# Patient Record
Sex: MALE | NOT HISPANIC OR LATINO | URBAN - METROPOLITAN AREA
[De-identification: names, ages, dates, MRNs, and addresses within clinical notes are randomized per-mention and may not be internally consistent; named-entity substitution may affect disease eponyms.]

---

## 2018-03-26 ENCOUNTER — COMMUNICATION - HEALTHEAST (OUTPATIENT)
Dept: TELEHEALTH | Facility: CLINIC | Age: 28
End: 2018-03-26

## 2018-03-26 ENCOUNTER — OFFICE VISIT - HEALTHEAST (OUTPATIENT)
Dept: INTERNAL MEDICINE | Facility: CLINIC | Age: 28
End: 2018-03-26

## 2018-03-26 DIAGNOSIS — R07.2 PRECORDIAL PAIN: ICD-10-CM

## 2018-03-26 DIAGNOSIS — Z00.00 HEALTH CARE MAINTENANCE: ICD-10-CM

## 2018-03-26 LAB
ALBUMIN SERPL-MCNC: 4.2 G/DL (ref 3.5–5)
ALBUMIN UR-MCNC: NEGATIVE MG/DL
ALP SERPL-CCNC: 65 U/L (ref 45–120)
ALT SERPL W P-5'-P-CCNC: 19 U/L (ref 0–45)
ANION GAP SERPL CALCULATED.3IONS-SCNC: 8 MMOL/L (ref 5–18)
APPEARANCE UR: CLEAR
AST SERPL W P-5'-P-CCNC: 23 U/L (ref 0–40)
BACTERIA #/AREA URNS HPF: ABNORMAL HPF
BILIRUB SERPL-MCNC: 0.7 MG/DL (ref 0–1)
BILIRUB UR QL STRIP: NEGATIVE
BUN SERPL-MCNC: 7 MG/DL (ref 8–22)
CALCIUM SERPL-MCNC: 9.4 MG/DL (ref 8.5–10.5)
CHLORIDE BLD-SCNC: 103 MMOL/L (ref 98–107)
CHOLEST SERPL-MCNC: 251 MG/DL
CO2 SERPL-SCNC: 27 MMOL/L (ref 22–31)
COLOR UR AUTO: YELLOW
CREAT SERPL-MCNC: 0.86 MG/DL (ref 0.7–1.3)
ERYTHROCYTE [DISTWIDTH] IN BLOOD BY AUTOMATED COUNT: 12.6 % (ref 11–14.5)
FASTING STATUS PATIENT QL REPORTED: NO
GFR SERPL CREATININE-BSD FRML MDRD: >60 ML/MIN/1.73M2
GLUCOSE BLD-MCNC: 83 MG/DL (ref 70–125)
GLUCOSE UR STRIP-MCNC: NEGATIVE MG/DL
HCT VFR BLD AUTO: 46.2 % (ref 40–54)
HDLC SERPL-MCNC: 60 MG/DL
HGB BLD-MCNC: 15.7 G/DL (ref 14–18)
HGB UR QL STRIP: ABNORMAL
KETONES UR STRIP-MCNC: NEGATIVE MG/DL
LDLC SERPL CALC-MCNC: 175 MG/DL
LEUKOCYTE ESTERASE UR QL STRIP: NEGATIVE
MCH RBC QN AUTO: 27.9 PG (ref 27–34)
MCHC RBC AUTO-ENTMCNC: 33.9 G/DL (ref 32–36)
MCV RBC AUTO: 82 FL (ref 80–100)
NITRATE UR QL: NEGATIVE
PH UR STRIP: 7 [PH] (ref 5–8)
PLATELET # BLD AUTO: 261 THOU/UL (ref 140–440)
PMV BLD AUTO: 7.7 FL (ref 7–10)
POTASSIUM BLD-SCNC: 3.7 MMOL/L (ref 3.5–5)
PROT SERPL-MCNC: 8 G/DL (ref 6–8)
RBC # BLD AUTO: 5.62 MILL/UL (ref 4.4–6.2)
RBC #/AREA URNS AUTO: ABNORMAL HPF
SODIUM SERPL-SCNC: 138 MMOL/L (ref 136–145)
SP GR UR STRIP: 1.01 (ref 1–1.03)
TRIGL SERPL-MCNC: 81 MG/DL
UROBILINOGEN UR STRIP-ACNC: ABNORMAL
WBC #/AREA URNS AUTO: ABNORMAL HPF
WBC: 4.7 THOU/UL (ref 4–11)

## 2018-03-26 ASSESSMENT — MIFFLIN-ST. JEOR: SCORE: 1945.09

## 2018-03-27 ENCOUNTER — COMMUNICATION - HEALTHEAST (OUTPATIENT)
Dept: INTERNAL MEDICINE | Facility: CLINIC | Age: 28
End: 2018-03-27

## 2019-02-05 ENCOUNTER — COMMUNICATION - HEALTHEAST (OUTPATIENT)
Dept: INTERNAL MEDICINE | Facility: CLINIC | Age: 29
End: 2019-02-05

## 2019-02-07 ENCOUNTER — OFFICE VISIT - HEALTHEAST (OUTPATIENT)
Dept: INTERNAL MEDICINE | Facility: CLINIC | Age: 29
End: 2019-02-07

## 2019-02-07 DIAGNOSIS — K59.01 SLOW TRANSIT CONSTIPATION: ICD-10-CM

## 2019-02-07 ASSESSMENT — MIFFLIN-ST. JEOR: SCORE: 1963.69

## 2019-02-15 ENCOUNTER — OFFICE VISIT - HEALTHEAST (OUTPATIENT)
Dept: INTERNAL MEDICINE | Facility: CLINIC | Age: 29
End: 2019-02-15

## 2019-02-15 DIAGNOSIS — Z00.00 ROUTINE GENERAL MEDICAL EXAMINATION AT A HEALTH CARE FACILITY: ICD-10-CM

## 2019-02-15 DIAGNOSIS — Z00.00 HEALTH CARE MAINTENANCE: ICD-10-CM

## 2019-02-15 DIAGNOSIS — E78.00 HYPERCHOLESTEROLEMIA: ICD-10-CM

## 2019-02-15 DIAGNOSIS — E55.9 VITAMIN D DEFICIENCY: ICD-10-CM

## 2019-02-15 LAB
ALBUMIN SERPL-MCNC: 4.5 G/DL (ref 3.5–5)
ALBUMIN UR-MCNC: NEGATIVE MG/DL
ALP SERPL-CCNC: 69 U/L (ref 45–120)
ALT SERPL W P-5'-P-CCNC: 11 U/L (ref 0–45)
ANION GAP SERPL CALCULATED.3IONS-SCNC: 9 MMOL/L (ref 5–18)
APPEARANCE UR: CLEAR
AST SERPL W P-5'-P-CCNC: 22 U/L (ref 0–40)
BACTERIA #/AREA URNS HPF: ABNORMAL HPF
BILIRUB SERPL-MCNC: 0.7 MG/DL (ref 0–1)
BILIRUB UR QL STRIP: NEGATIVE
BUN SERPL-MCNC: 7 MG/DL (ref 8–22)
CALCIUM SERPL-MCNC: 9.8 MG/DL (ref 8.5–10.5)
CHLORIDE BLD-SCNC: 102 MMOL/L (ref 98–107)
CHOLEST SERPL-MCNC: 192 MG/DL
CO2 SERPL-SCNC: 27 MMOL/L (ref 22–31)
COLOR UR AUTO: YELLOW
CREAT SERPL-MCNC: 0.95 MG/DL (ref 0.7–1.3)
ERYTHROCYTE [DISTWIDTH] IN BLOOD BY AUTOMATED COUNT: 11.3 % (ref 11–14.5)
FASTING STATUS PATIENT QL REPORTED: YES
GFR SERPL CREATININE-BSD FRML MDRD: >60 ML/MIN/1.73M2
GLUCOSE BLD-MCNC: 75 MG/DL (ref 70–125)
GLUCOSE UR STRIP-MCNC: NEGATIVE MG/DL
HCT VFR BLD AUTO: 44.5 % (ref 40–54)
HDLC SERPL-MCNC: 59 MG/DL
HGB BLD-MCNC: 14.7 G/DL (ref 14–18)
HGB UR QL STRIP: ABNORMAL
KETONES UR STRIP-MCNC: NEGATIVE MG/DL
LDLC SERPL CALC-MCNC: 120 MG/DL
LEUKOCYTE ESTERASE UR QL STRIP: NEGATIVE
MCH RBC QN AUTO: 28 PG (ref 27–34)
MCHC RBC AUTO-ENTMCNC: 33.1 G/DL (ref 32–36)
MCV RBC AUTO: 85 FL (ref 80–100)
NITRATE UR QL: NEGATIVE
PH UR STRIP: 6.5 [PH] (ref 5–8)
PLATELET # BLD AUTO: 290 THOU/UL (ref 140–440)
PMV BLD AUTO: 7.4 FL (ref 7–10)
POTASSIUM BLD-SCNC: 4 MMOL/L (ref 3.5–5)
PROT SERPL-MCNC: 7.6 G/DL (ref 6–8)
RBC # BLD AUTO: 5.25 MILL/UL (ref 4.4–6.2)
RBC #/AREA URNS AUTO: ABNORMAL HPF
SODIUM SERPL-SCNC: 138 MMOL/L (ref 136–145)
SP GR UR STRIP: 1.02 (ref 1–1.03)
SQUAMOUS #/AREA URNS AUTO: ABNORMAL LPF
TRIGL SERPL-MCNC: 63 MG/DL
UROBILINOGEN UR STRIP-ACNC: ABNORMAL
WBC #/AREA URNS AUTO: ABNORMAL HPF
WBC: 4.9 THOU/UL (ref 4–11)

## 2019-02-15 ASSESSMENT — MIFFLIN-ST. JEOR: SCORE: 1947.55

## 2019-02-18 LAB — 25(OH)D3 SERPL-MCNC: 11.1 NG/ML (ref 30–80)

## 2019-02-19 ENCOUNTER — COMMUNICATION - HEALTHEAST (OUTPATIENT)
Dept: INTERNAL MEDICINE | Facility: CLINIC | Age: 29
End: 2019-02-19

## 2019-02-19 DIAGNOSIS — E55.9 VITAMIN D DEFICIENCY: ICD-10-CM

## 2019-04-05 ENCOUNTER — COMMUNICATION - HEALTHEAST (OUTPATIENT)
Dept: INTERNAL MEDICINE | Facility: CLINIC | Age: 29
End: 2019-04-05

## 2019-04-12 ENCOUNTER — COMMUNICATION - HEALTHEAST (OUTPATIENT)
Dept: INTERNAL MEDICINE | Facility: CLINIC | Age: 29
End: 2019-04-12

## 2019-04-12 ENCOUNTER — OFFICE VISIT - HEALTHEAST (OUTPATIENT)
Dept: INTERNAL MEDICINE | Facility: CLINIC | Age: 29
End: 2019-04-12

## 2019-04-12 DIAGNOSIS — R07.9 CHEST PAIN, UNSPECIFIED TYPE: ICD-10-CM

## 2019-04-12 DIAGNOSIS — R07.89 CHEST WALL PAIN: ICD-10-CM

## 2019-04-12 LAB
ATRIAL RATE - MUSE: 74 BPM
DIASTOLIC BLOOD PRESSURE - MUSE: NORMAL MMHG
INTERPRETATION ECG - MUSE: NORMAL
P AXIS - MUSE: 60 DEGREES
PR INTERVAL - MUSE: 188 MS
QRS DURATION - MUSE: 86 MS
QT - MUSE: 356 MS
QTC - MUSE: 395 MS
R AXIS - MUSE: 71 DEGREES
SYSTOLIC BLOOD PRESSURE - MUSE: NORMAL MMHG
T AXIS - MUSE: 18 DEGREES
VENTRICULAR RATE- MUSE: 74 BPM

## 2019-04-12 ASSESSMENT — MIFFLIN-ST. JEOR: SCORE: 1960.06

## 2019-04-15 ENCOUNTER — OFFICE VISIT - HEALTHEAST (OUTPATIENT)
Dept: FAMILY MEDICINE | Facility: CLINIC | Age: 29
End: 2019-04-15

## 2019-04-15 ENCOUNTER — HOSPITAL ENCOUNTER (OUTPATIENT)
Dept: CARDIOLOGY | Facility: CLINIC | Age: 29
Discharge: HOME | End: 2019-04-15

## 2019-04-15 DIAGNOSIS — M99.02 SOMATIC DYSFUNCTION OF SPINE, THORACIC: ICD-10-CM

## 2019-04-15 DIAGNOSIS — R07.9 CHEST PAIN, UNSPECIFIED TYPE: ICD-10-CM

## 2019-04-15 DIAGNOSIS — R07.89 CHEST WALL PAIN: ICD-10-CM

## 2019-04-15 DIAGNOSIS — M99.08 SOMATIC DYSFUNCTION OF RIB: ICD-10-CM

## 2019-04-15 LAB
CV STRESS CURRENT BP HE: NORMAL
CV STRESS CURRENT HR HE: 100
CV STRESS CURRENT HR HE: 101
CV STRESS CURRENT HR HE: 101
CV STRESS CURRENT HR HE: 104
CV STRESS CURRENT HR HE: 106
CV STRESS CURRENT HR HE: 107
CV STRESS CURRENT HR HE: 112
CV STRESS CURRENT HR HE: 113
CV STRESS CURRENT HR HE: 115
CV STRESS CURRENT HR HE: 119
CV STRESS CURRENT HR HE: 120
CV STRESS CURRENT HR HE: 121
CV STRESS CURRENT HR HE: 124
CV STRESS CURRENT HR HE: 126
CV STRESS CURRENT HR HE: 131
CV STRESS CURRENT HR HE: 136
CV STRESS CURRENT HR HE: 138
CV STRESS CURRENT HR HE: 146
CV STRESS CURRENT HR HE: 146
CV STRESS CURRENT HR HE: 148
CV STRESS CURRENT HR HE: 154
CV STRESS CURRENT HR HE: 157
CV STRESS CURRENT HR HE: 157
CV STRESS CURRENT HR HE: 165
CV STRESS CURRENT HR HE: 165
CV STRESS CURRENT HR HE: 169
CV STRESS CURRENT HR HE: 82
CV STRESS CURRENT HR HE: 88
CV STRESS CURRENT HR HE: 99
CV STRESS CURRENT HR HE: 99
CV STRESS DEVIATION TIME HE: NORMAL
CV STRESS ECHO PERCENT HR HE: NORMAL
CV STRESS EXERCISE STAGE HE: NORMAL
CV STRESS EXERCISE STAGE REACHED HE: NORMAL
CV STRESS FINAL RESTING BP HE: NORMAL
CV STRESS FINAL RESTING HR HE: 101
CV STRESS MAX HR HE: 170
CV STRESS MAX TREADMILL GRADE HE: 16
CV STRESS MAX TREADMILL SPEED HE: 4.2
CV STRESS PEAK DIA BP HE: NORMAL
CV STRESS PEAK SYS BP HE: NORMAL
CV STRESS PHASE HE: NORMAL
CV STRESS PROTOCOL HE: NORMAL
CV STRESS RESTING PT POSITION HE: NORMAL
CV STRESS RESTING PT POSITION HE: NORMAL
CV STRESS ST DEVIATION AMOUNT HE: NORMAL
CV STRESS ST DEVIATION ELEVATION HE: NORMAL
CV STRESS ST EVELATION AMOUNT HE: NORMAL
CV STRESS TEST TYPE HE: NORMAL
CV STRESS TOTAL STAGE TIME MIN 1 HE: NORMAL
STRESS ECHO BASELINE BP: NORMAL
STRESS ECHO BASELINE HR: 82
STRESS ECHO CALCULATED PERCENT HR: 89 %
STRESS ECHO LAST STRESS BP: NORMAL
STRESS ECHO LAST STRESS HR: 169
STRESS ECHO POST ESTIMATED WORKLOAD: 10.9
STRESS ECHO POST EXERCISE DUR MIN: 9
STRESS ECHO POST EXERCISE DUR SEC: 25
STRESS ECHO TARGET HR: 169.99

## 2019-04-16 ENCOUNTER — COMMUNICATION - HEALTHEAST (OUTPATIENT)
Dept: INTERNAL MEDICINE | Facility: CLINIC | Age: 29
End: 2019-04-16

## 2019-11-06 ENCOUNTER — COMMUNICATION - HEALTHEAST (OUTPATIENT)
Dept: INTERNAL MEDICINE | Facility: CLINIC | Age: 29
End: 2019-11-06

## 2021-05-27 NOTE — PATIENT INSTRUCTIONS - HE
Try arnica montana or voltaren gel.  May also try osteopathic manipulation.  Adenike Allen in [place] does this.  Doctor of Osteopathy.    Go to ER if acute worsening

## 2021-05-27 NOTE — TELEPHONE ENCOUNTER
Chest pain it is on the left side is where it started but now it is on the right and left    The pain is only in the chest doesn't radiate    The pain has been going on for a year    Sometimes the pain is sharp and sometimes the pain is itchy.    The pain is constant this week    Has not been able to find anything that makes the pain worse and nothing makes it better    Rates his pain 5 out of 10    No dizziness,    No nausea    No vomiting    No sweating    No fever    No shortness of breath    No cough    Was seen by Dr Scott for this on 2/15/19:    6.  Episodic precordial chest discomfort  He has noted this on and off for years.  It is described as a vague twinge or poking in the left chest which is not related to exertion.  He was reassured that it does not seem      Appointment scheduled for today    Janice Felipe RN  [facility] Refill and Triage Nurse  4/12/2019  1:14 PM      Reason for Disposition    Patient wants to be seen    Protocols used: CHEST PAIN-A-OH

## 2021-05-27 NOTE — PROGRESS NOTES
ASSESSMENT and PLAN:  Karina was seen today for chest pain.    Diagnoses and all orders for this visit:    Chest wall pain    Somatic dysfunction of rib    Somatic dysfunction of spine, thoracic      Differential diagnosis of atypical chest pain includes costochondritis, somatic dysfunction.  Cardiac abnormality was ruled out today with stress testing.  I would also consider evaluating for aneurysm as that could potentially be life-threatening and would consider chest CT with contrast if symptoms persist.  Patient has a difficult time describing his pain and cannot tell if it is more in the chest wall versus more internal in the lungs.  No concern in review of systems and history for underlying lung pathology and does not sound consistent with pulmonary embolism.  He does have chest wall pain to palpation that could be consistent with costochondritis and we discussed treatment with NSAIDs.  Symptoms are odd and seem to be worse with eating but he denies acid reflux symptoms.  We discussed it could be secondary to expansion of his rib cage and abdomen that he senses his symptoms more so he is going to work on smaller portions and may be see if there is a correlation with the type of food.  Osteopathic manual therapy was performed today and he is going to evaluate if he did receive any relief with that.  He will let me know if he would like to come back to schedule a follow-up adjustment if it was helpful      Patient Instructions   -keep a diary of when the pain comes on with food: large portions? Small portions? Type of food ?     Ok to use ibuprofen 200mg- 2-3 tablets every 6 hours as needed   Or   Aleve naproxen 1 tablet twice daily as needed     -if symptoms continue call me and I will order a chest CT with contrast otherwise we can consider  An adjustment in the future as well.       No orders of the defined types were placed in this encounter.    There are no discontinued medications.    No follow-ups on  file.    DATA REVIEWED:  Additional History from Old Records Summarized (2): Reviewed office notes from PCP and Dr. Granados regarding chest pain  Decision to Obtain Records (1):    Radiology Tests Summarized or Ordered (1): Reviewed chest x-ray results that were normal  Labs Reviewed or Ordered (1):    Medicine Test Summarized or Ordered (1): Reviewed normal stress testing today  Independent Review of EKG, X-RAY, or RAPID STREP (2 each):      The visit lasted a total of 30  minutes face to face with the patient. Over 50% of the time was spent counseling and educating the patient regarding above issues and at least 10-15 minutes spent performing osteopathic manual therapy.    CHIEF COMPLAINT:  Chief Complaint   Patient presents with     Chest Pain     Starts with a sharp pain in the chest pain and spreads across the chest to the shoulders and back.  Has seen chiro, PMD and stress test.         HISTORY OF PRESENT ILLNESS:  Karina Ruiz is a 28 y.o. male was referred to me for evaluation of chest pain and evaluation of musculoskeletal dysfunction  Patient notes chest pain x 1 year.   cxr normal in march when he saw his PCP.     -have seen chiropractor once per month for 6-7 months. He/she wasmore focused on shoulders.  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   -living here x 6 years.  Moved from [place].  No kids.  -few years ago was workiing out.  Does not do any heavy lifting or strenuous work now.  - gets the chest pain when eating or sleeping mostly.  Does sleep on his left side with his arm extended. . Left side feels looser and easier- right side weaker when lifting..  righthanded .  Denies any paresthesias.  -pain in the chest is sharp and feels like things about to rip apart or itchy.  Nothing with exertion but did feel when was eating . Doesn't think of a food allergy. Pain lasts few hours 5/10. Doesn't do anything for relief. Painful along rib cage  -eat once a day large  portions  -no heart disease in the family.  No history of aneurysm.  No risk factor for blood clot.  -4-5 times per week.   -Stress test today was normal.  Does not typically have blood pressure issues and reviewed previous office notes.  Stress test was stopped due to extremely high blood pressure    REVIEW OF SYSTEMS:    Comprehensive review of systems is negative except as noted in the HPI.     PFSH:  Tobacco use and medications reviewed below.     TOBACCO USE:  Social History     Tobacco Use   Smoking Status Never Smoker   Smokeless Tobacco Never Used       VITALS:  Vitals:    04/15/19 1633   BP: 138/73   Pulse: 97   Resp: 20   Temp: 98.4  F (36.9  C)   TempSrc: Oral   Weight: 216 lb (98 kg)     Wt Readings from Last 3 Encounters:   04/15/19 216 lb (98 kg)   04/12/19 214 lb 4.8 oz (97.2 kg)   02/15/19 210 lb (95.3 kg)       PHYSICAL EXAM:  Constitutional:  Reveals a 28 y.o. male in NAD.  Vitals:  Per nursing notes.  Neck:  Supple, thyroid not palpable.  Cardiac:  Regular rate and rhythm without murmurs, rubs, or gallops. Carotids without bruits. Legs without edema. Palpation of the radial pulse regular.  Lungs: Clear.  Respiratory effort normal.  Skin:   Without rash, bruise, or palpable lesions.  Rheumatologic: Normal joints and nails of the hands.  Neurologic:  Cranial nerves II-XII intact.     Psychiatric:  Mood appropriate, memory intact.   Musculoskeletal/OMT-pain to palpation along anterior ribs along the left side ribs 3 4 and 5, pain to palpation along posterior ribs 4 through 7, inhalation dysfunction on the left.  T3 through T8 side bent right rotated left    Procedure osteopathic manual therapy  Verbal consent obtained and osteopathic manual therapy was performed to to body regions rib and thoracic regions using combination of myofascial release, strain counterstrain and stills in direct technique as well as muscle energy.  Patient did have improvement of some areas of somatic dysfunction at least by  50% but not 100%      MEDICATIONS:  Current Outpatient Medications   Medication Sig Dispense Refill     diclofenac sodium (VOLTAREN) 1 % Gel Apply 2 g topically every 6 (six) hours as needed. Apply 2 grams per application. 1 Tube 1     ergocalciferol (ERGOCALCIFEROL) 50,000 unit capsule Take 1 capsule (50,000 Units total) by mouth once a week for 12 doses. 12 capsule 0     No current facility-administered medications for this visit.

## 2021-05-27 NOTE — PATIENT INSTRUCTIONS - HE
-keep a diary of when the pain comes on with food: large portions? Small portions? Type of food ?     Ok to use ibuprofen 200mg- 2-3 tablets every 6 hours as needed   Or   Aleve naproxen 1 tablet twice daily as needed     -if symptoms continue call me and I will order a chest CT with contrast otherwise we can consider  An adjustment in the future as well.

## 2021-05-27 NOTE — PROGRESS NOTES
[facility] Clinic Note    Patient Name: Karina Ruiz  Patient Age: 28 y.o.  YOB: 1990  MRN: [id number]    Date of visit: 4/12/2019    Assessment/Plan:  Recent Results (from the past 24 hour(s))   Electrocardiogram Perform - Clinic   Result Value Ref Range    SYSTOLIC BLOOD PRESSURE  mmHg    DIASTOLIC BLOOD PRESSURE  mmHg    VENTRICULAR RATE 74 BPM    ATRIAL RATE 74 BPM    P-R INTERVAL 188 ms    QRS DURATION 86 ms    Q-T INTERVAL 356 ms    QTC CALCULATION (BEZET) 395 ms    P Axis 60 degrees    R AXIS 71 degrees    T AXIS 18 degrees    MUSE DIAGNOSIS       Normal sinus rhythm  Nonspecific T wave abnormality  Abnormal ECG  No previous ECGs available       Medications Ordered   Medications     diclofenac sodium (VOLTAREN) 1 % Gel     Sig: Apply 2 g topically every 6 (six) hours as needed. Apply 2 grams per application.     Dispense:  1 Tube     Refill:  1     goodrx       ICD-10-CM    1. Chest pain, unspecified type R07.9 Electrocardiogram Perform - Clinic     Stress Test Graded   2. Chest wall pain R07.89 diclofenac sodium (VOLTAREN) 1 % Gel       EKG reviewed, I have low suspicion of cardiac pathology given his age.  However, given his history of symptoms I do think a stress test would be reasonable.  We will order this.  If any acute worsening he should go to the emergency room.  Seems his primary chest pain complaints are skeletal in nature.    Patient Instructions   Try arnica [place]ana or voltaren gel.  May also try osteopathic manipulation.  Adenike Allen in [place] does this.  Doctor of Osteopathy.    Go to ER if acute worsening      Counseled patient regarding treatments, treatment options, risks and benefits and diagnosis.  The patient was interactive, attentive, verbalized understanding, and we discussed plan.       Patient Active Problem List   Diagnosis     Migraine Headache     Folliculitis     Lower Back Pain     Hypercholesterolemia     Social History     Social History Narrative      Not on file     No family history on file.  Outpatient Encounter Medications as of 4/12/2019   Medication Sig Dispense Refill     diclofenac sodium (VOLTAREN) 1 % Gel Apply 2 g topically every 6 (six) hours as needed. Apply 2 grams per application. 1 Tube 1     ergocalciferol (ERGOCALCIFEROL) 50,000 unit capsule Take 1 capsule (50,000 Units total) by mouth once a week for 12 doses. 12 capsule 0     No facility-administered encounter medications on file as of 4/12/2019.        Chief Complaint:   Chief Complaint   Patient presents with     Chest Pain     Intermittent chest pain, denied SOB and have't pay attention on numbing nd thingling on arm and leg       /70 (Patient Site: Left Arm, Patient Position: Sitting, Cuff Size: Adult Regular)   Pulse 96   Ht 6' (1.829 m)   Wt 214 lb 4.8 oz (97.2 kg)   SpO2 98%   BMI 29.06 kg/m    HPI:   Continues to have chest pain on and off.  Has had for over a year.  Happens intermittently.  Has had pain since this morning, at present right of sternum 3rd rib.  Pinpoint.  Will only have along sternum/rib junction and it is always only a pinpoint area but will have on left or right of rib cage. No heart history or shob.  Worse when he twists    Did have an episode at noon where he was eating [place] food and felt heartburn.  He states that he is intending to prepare for a marathon this summer.  He states that he does have some chest pain in the first half mile that he runs.  He says sometimes it will go away sometimes it will not.  No shortness of breath, however.  No cardiac history.    ROS: Pertinent ros findings in hpi, all other systems negative.    Objective/Physical Exam:     /70 (Patient Site: Left Arm, Patient Position: Sitting, Cuff Size: Adult Regular)   Pulse 96   Ht 6' (1.829 m)   Wt 214 lb 4.8 oz (97.2 kg)   SpO2 98%   BMI 29.06 kg/m      Gen: NAD, appears age  Skin: warm, dry  HENT: normocephalic atraumatic, MMM  Eyes: non-icteric, no proptosis  CV: NRRR  no m/r/g, no peripheral edema  Resp: CTAB no w/r/r, normal respiratory effort  Abd: non-distended, soft  Hematologic: No petechiae or purpura  MSK: no muscle or joint swelling  Neuro: no dysarthria or gross asymmetry  Psych: Cooperative, full affect    Tenderness to palpation right sternum at the junction of the third rib.  Mildly tender in all areas along sternum left and right.      Juanito Granados MD

## 2021-05-27 NOTE — TELEPHONE ENCOUNTER
LMTCB please ask below message to pt.    We do not have record that you received a flu vaccine this year. Did you get one outside of the clinic? If so, do you remember where and an approximate date?    Thank you.

## 2021-05-27 NOTE — TELEPHONE ENCOUNTER
Patient Returning Call  Reason for call:  Return call  Information relayed to patient:  Patient was informed he had a good stress test.  Patient has additional questions:  No  If YES, what are your questions/concerns:  n/a  Okay to leave a detailed message?: No call back needed

## 2021-06-01 VITALS — HEIGHT: 72 IN | WEIGHT: 211 LBS | BODY MASS INDEX: 28.58 KG/M2

## 2021-06-02 VITALS — WEIGHT: 214.3 LBS | HEIGHT: 72 IN | BODY MASS INDEX: 29.02 KG/M2

## 2021-06-02 VITALS — WEIGHT: 215.1 LBS | HEIGHT: 72 IN | BODY MASS INDEX: 29.13 KG/M2

## 2021-06-02 VITALS — BODY MASS INDEX: 28.44 KG/M2 | WEIGHT: 210 LBS | HEIGHT: 72 IN

## 2021-06-02 VITALS — WEIGHT: 216 LBS | BODY MASS INDEX: 29.29 KG/M2

## 2021-06-03 NOTE — TELEPHONE ENCOUNTER
Pt reqeusting dr monroe complete form    And please call pt when ready and pt will  form    Phone;  [phone] FRANSISCO

## 2021-06-16 PROBLEM — E78.00 HYPERCHOLESTEROLEMIA: Status: ACTIVE | Noted: 2019-02-15

## 2021-06-16 NOTE — PROGRESS NOTES
ASSESSMENT:  1. Health care maintenance  Basic monitoring labs will be checked.  Weight has increased since he was last seen, but is still in young adult normal range.  Health maintenance habits seem good  - Comprehensive Metabolic Panel  - Lipid Cascade  - HM2(CBC w/o Differential)  - Urinalysis-UC if Indicated    2. Precordial pain  Symptoms are vague and do not seem cardiac.  Chest x-ray was checked.  No structural abnormalities were noted  - XR Chest 2 Views    PLAN:  1.  Labs as outlined.  He will be notified of test results.  2.  Chest x-ray was done and reviewed  3.  Health maintenance habits were discussed.  He will be notified of test results.  See in 1 year or as needed.    Orders Placed This Encounter   Procedures     XR Chest 2 Views     Order Specific Question:   Can the procedure be changed per Radiologist protocol?     Answer:   Yes     Comprehensive Metabolic Panel     Lipid Cascade     Order Specific Question:   Fasting is required?     Answer:   Unknown     HM2(CBC w/o Differential)     Urinalysis-UC if Indicated     There are no discontinued medications.    No Follow-up on file.    ASSESSED PROBLEMS:  1. Health care maintenance  Comprehensive Metabolic Panel    Lipid Cascade    HM2(CBC w/o Differential)    Urinalysis-UC if Indicated   2. Precordial pain  XR Chest 2 Views       CHIEF COMPLAINT:  Chief Complaint   Patient presents with     Annual Exam       HISTORY OF PRESENT ILLNESS:  Karina is a 27 y.o. male presenting to the clinic today for an annual physical exam.     Subdermal Chest Discomfort: He has been feeling a bit funny over the past year. His chest is quite itchy, and the itch appears to be coming from the inside. He notes no triggers to result in this feeling. Sometimes he feels his chest is stretching abnormally. This is unrelated to activity. He wondered if this could be related to eating, but he notes no obvious trigger.    Headache: He as a recurrent headache on the left side of his  head. The left side of his head feels quite heavy. This does not feel similar to migraine pain. This is brought on by shaking his head. This pan has improved recently. He has not recently has migraine headaches. He has stopped drinking coffee and found this effective in alleviating his migraines.    Back Pain: He visited a chiropractor, and notes that his back has not been bothering him.      REVIEW OF SYSTEMS:   All other systems are negative.    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 He is currently working for his previous employer in the [place]. He has been taking online classes in [place]. 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 He loves working with kids and hopes he will one day be able to help them to design new technology.     Family: He notes no issues in his family.     History   Smoking Status     Never Smoker   Smokeless Tobacco     Never Used       No family history on file.    Social History     Social History     Marital status: Single     Spouse name: N/A     Number of children: N/A     Years of education: N/A     Occupational History     Not on file.     Social History Main Topics     Smoking status: Never Smoker     Smokeless tobacco: Never Used     Alcohol use Not on file     Drug use: Not on file     Sexual activity: Not on file     Other Topics Concern     Not on file     Social History Narrative       No past surgical history on file.    No Known Allergies    Active Ambulatory Problems     Diagnosis Date Noted     Migraine Headache      Folliculitis      Lower Back Pain      Resolved Ambulatory Problems     Diagnosis Date Noted     No Resolved Ambulatory Problems     No Additional Past Medical History       VITALS:  Vitals:    03/26/18 1528   BP: 114/80   Patient Site: Left Arm   Patient Position: Sitting   Cuff Size: Adult Regular   Pulse: 68   SpO2: 99%   Weight: 211 lb (95.7 kg)   Height: 6' (1.829 m)      Wt Readings from Last 3 Encounters:   03/26/18 211 lb (95.7 kg)   06/02/16 190 lb 11.2 oz (86.5 kg)   03/23/15 180 lb 8 oz (81.9 kg)     Body mass index is 28.62 kg/(m^2).    PHYSICAL EXAM:  Constitutional:   Reveals an alert, pleasant, talkative male.  Vitals: per nursing notes.  Body fat percentage: 16.8%  Normal heart size, clear lung fields.  HEENT: atraumatic Ears:  External canals, TMs clear.    Eyes:  EOMs full, PERRL.  Oropharynx:   Mouth and throat clear, no thrush or exudate.  Neck:  Supple, no carotid bruits or adenopathy.  Back:  No spine or CVA pain.  Thorax:  No bony deformities. No chest wall tend to touch  Lungs: Clear to A&P without rales or wheezes.  Respiratory effort normal.  Cardiac:   Regular rate and rhythm, normal S1, S2, no murmur or gallop.  Abdomen:  Soft, active bowel sounds without bruits, mass, or tenderness. Femoral pulses intact.  : No testicular masses, no penile lesions.    Rectal: No masses.   Prostate without nodules.  No inguinal hernias.  Extremities:   No peripheral edema, pulses in the feet intact.    Skin:  No jaundice, peripheral cyanosis or lesions to suggest malignancy.  Neuro:  Alert and oriented. Cranial nerves, motor, sensory exams are intact.  No gross focal deficits.  Psychiatric:  Memory intact, mood appropriate.    QUALITY MEASURES:    ADDITIONAL HISTORY SUMMARIZED (2): None  DECISION TO OBTAIN EXTRA INFORMATION (1): None  RADIOLOGY TESTS (1): Chest x-ray ordered.    LABS (1): Ordered HM2, lipids.   MEDICINE TESTS (1): None  INDEPENDENT REVIEW (2 each): None    Total data points: 2    The visit lasted a total of 33 minutes face to face with the patient. Over 50% of the time was spent counseling and educating the patient about physical exam.    IShirin, am scribing for and in the presence of, Dr. Scott.    I, Dr. Scott, personally performed the services described in this documentation, as scribed by Shirin Mednoza in my presence, and it is both  accurate and complete.    Dragon dictation was used for this note.  Speech recognition errors are a possibility.    MEDICATIONS:  Current Outpatient Prescriptions   Medication Sig Dispense Refill     clindamycin (CLEOCIN T) 1 % lotion        clobetasol (TEMOVATE) 0.05 % external solution        ibuprofen (ADVIL,MOTRIN) 600 MG tablet        No current facility-administered medications for this visit.

## 2021-06-18 NOTE — LETTER
Letter by Bart Scott MD at      Author: Bart Scott MD Service: -- Author Type: --    Filed:  Encounter Date: 2/19/2019 Status: (Other)       Karina Ruiz  [address] [place] [address]             February 19, 2019         Dear Mr. Ruiz,    Below are the results from your recent visit:    Resulted Orders   Lipid Cascade   Result Value Ref Range    Cholesterol 192 <=199 mg/dL    Triglycerides 63 <=149 mg/dL    HDL Cholesterol 59 >=40 mg/dL    LDL Calculated 120 <=129 mg/dL    Patient Fasting > 8hrs? Yes    Comprehensive Metabolic Panel   Result Value Ref Range    Sodium 138 136 - 145 mmol/L    Potassium 4.0 3.5 - 5.0 mmol/L    Chloride 102 98 - 107 mmol/L    CO2 27 22 - 31 mmol/L    Anion Gap, Calculation 9 5 - 18 mmol/L    Glucose 75 70 - 125 mg/dL    BUN 7 (L) 8 - 22 mg/dL    Creatinine 0.95 0.70 - 1.30 mg/dL    GFR MDRD Af Amer >60 >60 mL/min/1.73m2    GFR MDRD Non Af Amer >60 >60 mL/min/1.73m2    Bilirubin, Total 0.7 0.0 - 1.0 mg/dL    Calcium 9.8 8.5 - 10.5 mg/dL    Protein, Total 7.6 6.0 - 8.0 g/dL    Albumin 4.5 3.5 - 5.0 g/dL    Alkaline Phosphatase 69 45 - 120 U/L    AST 22 0 - 40 U/L    ALT 11 0 - 45 U/L    Narrative    Fasting Glucose reference range is 70-99 mg/dL per  American Diabetes Association (ADA) guidelines.   HM2(CBC w/o Differential)   Result Value Ref Range    WBC 4.9 4.0 - 11.0 thou/uL    RBC 5.25 4.40 - 6.20 mill/uL    Hemoglobin 14.7 14.0 - 18.0 g/dL    Hematocrit 44.5 40.0 - 54.0 %    MCV 85 80 - 100 fL    MCH 28.0 27.0 - 34.0 pg    MCHC 33.1 32.0 - 36.0 g/dL    RDW 11.3 11.0 - 14.5 %    Platelets 290 140 - 440 thou/uL    MPV 7.4 7.0 - 10.0 fL   Urinalysis-UC if Indicated   Result Value Ref Range    Color, UA Yellow Colorless, Yellow, Straw, Light Yellow    Clarity, UA Clear Clear    Glucose, UA Negative Negative    Bilirubin, UA Negative Negative    Ketones, UA Negative Negative    Specific Gravity, UA 1.020 1.005 - 1.030    Blood, UA Trace (!) Negative    pH, UA 6.5 5.0  - 8.0    Protein, UA Negative Negative mg/dL    Urobilinogen, UA 0.2 E.U./dL 0.2 E.U./dL, 1.0 E.U./dL    Nitrite, UA Negative Negative    Leukocytes, UA Negative Negative    Bacteria, UA None Seen None Seen hpf    RBC, UA 3-5 (!) None Seen, 0-2 hpf    WBC, UA 0-5 None Seen, 0-5 hpf    Squam Epithel, UA 0-5 None Seen, 0-5 lpf    Narrative    UC not indicated   Vitamin D, Total (25-Hydroxy)   Result Value Ref Range    Vitamin D, Total (25-Hydroxy) 11.1 (L) 30.0 - 80.0 ng/mL    Narrative    Deficiency <10.0 ng/mL  Insufficiency 10.0-29.9 ng/mL  Sufficiency 30.0-80.0 ng/mL  Toxicity (possible) >100.0 ng/mL       Alga: Your vitamin D level is very low at 11.1.  I would advise taking vitamin D 50,000 IU weekly for 3 months as a booster.  Then switch to vitamin D 2000 IU daily as an over-the-counter supplement.  Take this indefinitely.  Your lipids are good with a total cholesterol of 192 and an LDL of 120.  Other labs including your potassium, blood sugar, hemoglobin, liver and kidney tests are normal.  It was nice to see you.  I will send the vitamin D prescription to your pharmacy.    Please call with questions or contact us using [employer].    Sincerely,        Electronically signed by Bart Scott MD

## 2021-06-23 NOTE — TELEPHONE ENCOUNTER
Pt has constipation concerns  Has appt on 2-15-19 with dr monroe  Would dr want to see pt sooner?    please call pt back  FRANSISCO

## 2021-06-23 NOTE — TELEPHONE ENCOUNTER
I cannot see this week.  If I have openings, he could be seen next week.  I would not double book.

## 2021-06-23 NOTE — PATIENT INSTRUCTIONS - HE
Whole Foods, Plant-Based    Abundant vegetables.    Only whole grains.    2-4 fruits/day.    Avoid animal products such as dairy, eggs and meat. (instead, take a vitamin b12 supplement)    Avoid sugars, oils and other processed foods.    Documentary: [place]     Web: Nutritionstudies.org, Nutritionfacts.org    Books: How Not to Die (Lexi), The [place] Study (Franki)

## 2021-06-23 NOTE — PROGRESS NOTES
[facility] Clinic Note    Patient Name: Karina Ruiz  Patient Age: 28 y.o.  YOB: 1990  MRN: [id number]    Date of visit: 2/7/2019    Patient Active Problem List   Diagnosis     Migraine Headache     Folliculitis     Lower Back Pain     Social History     Social History Narrative     Not on file     No family history on file.  Outpatient Encounter Medications as of 2/7/2019   Medication Sig Dispense Refill     clindamycin (CLEOCIN T) 1 % lotion        clobetasol (TEMOVATE) 0.05 % external solution        ibuprofen (ADVIL,MOTRIN) 600 MG tablet        polyethylene glycol (GLYCOLAX) 17 gram/dose powder Take 17 g by mouth daily. 255 g 1     senna-docusate (SENNOSIDES-DOCUSATE SODIUM) 8.6-50 mg tablet Take 2 tablets by mouth daily for 7 days. 30 tablet 0     No facility-administered encounter medications on file as of 2/7/2019.        Chief Complaint:   Chief Complaint   Patient presents with     Constipation     on and off x 1 month - can be constipated for up to a week       /70 (Patient Site: Left Arm, Patient Position: Sitting, Cuff Size: Adult Large)   Pulse 68   Ht 6' (1.829 m)   Wt 215 lb 1.6 oz (97.6 kg)   SpO2 98%   BMI 29.17 kg/m    HPI:   Last month he was quite constipated for about a week.  Took mag citrate. Since then has been having difficult stools. Stooling about once a week.  Prior to this had hard stools regularly.  Has abdominal pain but with stool that improves.  Last time he wiped he saw small amt. Blood.  Has had hard stools for his whole life.  Cooks for himself.  Eats a lot of red meat.  Eats lots of rice, some fufu, white bread.  Not much vegetable or fruit.  Drinks lots of water.  No dairy.      ROS: Pertinent ros findings in hpi, all other systems negative.    Objective/Physical Exam:     /70 (Patient Site: Left Arm, Patient Position: Sitting, Cuff Size: Adult Large)   Pulse 68   Ht 6' (1.829 m)   Wt 215 lb 1.6 oz (97.6 kg)   SpO2 98%   BMI 29.17 kg/m       Gen: NAD, appears age  Skin: warm, dry  HENT: normocephalic atraumatic, MMM  Eyes: non-icteric, no proptosis  CV: NRRR no m/r/g, no peripheral edema  Resp: CTAB no w/r/r, normal respiratory effort  Abd: non-distended, soft  Hematologic: No petechiae or purpura  MSK: no muscle or joint swelling  Neuro: no dysarthria or gross asymmetry  Psych: Cooperative, full affect    External:  Thrombosed vessels: no  Rash/skin breakdown: no  Fissure: no  No impacted stool  Rectal tone: normal  Stool in vault: minimal     Gross blood: no      Assessment/Plan:  No results found for this or any previous visit (from the past 24 hour(s)).  Medications Ordered   Medications     polyethylene glycol (GLYCOLAX) 17 gram/dose powder     Sig: Take 17 g by mouth daily.     Dispense:  255 g     Refill:  1     senna-docusate (SENNOSIDES-DOCUSATE SODIUM) 8.6-50 mg tablet     Sig: Take 2 tablets by mouth daily for 7 days.     Dispense:  30 tablet     Refill:  0       ICD-10-CM    1. Slow transit constipation K59.01        Likely largely lifestyle related.  We discussed changing the diet below both for this and for his cholesterol.  He does seem motivated to do this.  We will also start some MiraLAX as well as senna.  If not improving with this, we discussed the fact that he may need a GI referral with possibly colonoscopy or imaging to delineate etiology.      Patient Instructions   Whole Foods, Plant-Based    Abundant vegetables.    Only whole grains.    2-4 fruits/day.    Avoid animal products such as dairy, eggs and meat. (instead, take a vitamin b12 supplement)    Avoid sugars, oils and other processed foods.    Documentary: [place]     Web: Nutritionstudies.org, Nutritionfacts.org    Books: How Not to Die (Lexi), The [place] Study (Franki)        Counseled patient regarding treatments, treatment options, risks and benefits and diagnosis.  The patient was interactive, attentive, verbalized understanding, and we discussed plan.      Juanito Granados MD

## 2021-06-23 NOTE — TELEPHONE ENCOUNTER
Tries to call patient to notify of message. Dr. Bowman has appointments available on Friday 2/8. Please notify patient

## 2021-06-24 NOTE — PATIENT INSTRUCTIONS - HE
1.  Mediterranean style diet.    .2.  I will notify you of labs    3.  See in one year or as needed.

## 2021-06-24 NOTE — PROGRESS NOTES
ASSESSMENT:  1. Routine general medical examination at a health care facility  Karina has a body fat at the high end of acceptable at 19.3%.  He was encouraged to increase his activity level.  Diet was reviewed.  Flu shot was advised but declined.  He is up-to-date on other immunizations    2. Hypercholesterolemia  Cholesterol is over 250 last year.  It was down to slightly above 200 on recheck through his employer several weeks ago  - Lipid [place]    3. Health care maintenance  Monitoring labs will be checked as outlined  - Lipid C[place]  - Comprehensive Metabolic Panel  - HM2(CBC w/o Differential)  - Urinalysis-UC if Indicated    4. Vitamin D deficiency  He is not taking a vitamin D supplement.  Risk for vitamin D deficiency is significant.  A level will be checked.  - Vitamin D, Total (25-Hydroxy)    5.  Constipation  This has improved since his recent clinic visit.  Increasing dietary fiber was advised    6.  Episodic precordial chest discomfort  He has noted this on and off for years.  It is described as a vague twinge or poking in the left chest which is not related to exertion.  He was reassured that it does not seem  PLAN:  Patient Instructions   1.  Mediterranean style diet.    .2.  I will notify you of labs    3.  See in one year or as needed.    4.  Flu shot was advised but declined    Orders Placed This Encounter   Procedures     Lipid Cascade     Order Specific Question:   Fasting is required?     Answer:   Unknown     Comprehensive Metabolic Panel     HM2(CBC w/o Differential)     Urinalysis-UC if Indicated     Vitamin D, Total (25-Hydroxy)     Medications Discontinued During This Encounter   Medication Reason     senna-docusate (SENNOSIDES-DOCUSATE SODIUM) 8.6-50 mg tablet Therapy completed     polyethylene glycol (GLYCOLAX) 17 gram/dose powder Therapy completed     ibuprofen (ADVIL,MOTRIN) 600 MG tablet Therapy completed     clobetasol (TEMOVATE) 0.05 % external solution Therapy completed      clindamycin (CLEOCIN T) 1 % lotion Therapy completed       Return in about 1 year (around 2/15/2020) for Annual physical.    ASSESSED PROBLEMS:  1. Health care maintenance  Lipid [place]    Comprehensive Metabolic Panel    HM2(CBC w/o Differential)    Urinalysis-UC if Indicated   2. Routine general medical examination at a health care facility     3. Hypercholesterolemia  Lipid Cascade   4. Vitamin D deficiency  Vitamin D, Total (25-Hydroxy)       CHIEF COMPLAINT:  Chief Complaint   Patient presents with     Annual Exam     pt fasting     Immunizations     Declined flu       HISTORY OF PRESENT ILLNESS:  Karina is a 28 y.o. male presenting to the clinic today for physical, check up, with complaints of constipation and concerns over his cholesterol level, and chest pain.     Constipation: He reached out to this clinic about 2 weeks ago and saw Dr. Edmondson, who thought the patients diet was to blame. So he has decreased his meat intake and tried to increase his fiber in the diet. The constipation is doing better now. He was also prescribed a pair of medications at that visit that he has not needed, and as he is going more regularly now, he does not think he will use them at any point.     Hypercholesterolemia: He was at 251 on labs last year. HE had been counseled about lifestyle management. He did screening with his insurance company that showed him cholesterol at 204.      Chest Pain: He gets a sensation of shocks, or a pinching sensation in his chest that does not vary with breathing. If he runs he will get the pain, but if he runs through it, it goes away.     Health Maintenance: Blood work due.     REVIEW OF SYSTEMS:   Denies diarrhea, hematochezia/melena, malaise, fatigue, weakness,   Admits to occasional chest pain that resolves regardless of rest.   All other systems are negative.    PFSH:  He recently changed his diet and is feeling well with it.   He does not have any trips this winter planned, but will go to  "[place] and [place] this summer.   He does some cardio exercise, but is not as regular as he would like to be.   He goes to the chiropractor Q3 months for general adjustments.   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  Reviewed as below.    Social History     Tobacco Use   Smoking Status Never Smoker   Smokeless Tobacco Never Used       No family history on file.    Social History     Socioeconomic History     Marital status: Single     Spouse name: Not on file     Number of children: Not on file     Years of education: Not on file     Highest education level: Not on file   Social Needs     Financial resource strain: Not on file     Food insecurity - worry: Not on file     Food insecurity - inability: Not on file     Transportation needs - medical: Not on file     Transportation needs - non-medical: Not on file   Occupational History     Not on file   Tobacco Use     Smoking status: Never Smoker     Smokeless tobacco: Never Used   Substance and Sexual Activity     Alcohol use: Not on file     Drug use: Not on file     Sexual activity: Not on file   Other Topics Concern     Not on file   Social History Narrative     Not on file       No past surgical history on file.    No Known Allergies    Active Ambulatory Problems     Diagnosis Date Noted     Migraine Headache      Folliculitis      Lower Back Pain      Hypercholesterolemia 02/15/2019     Resolved Ambulatory Problems     Diagnosis Date Noted     No Resolved Ambulatory Problems     No Additional Past Medical History       VITALS:  Vitals:    02/15/19 1456   BP: 128/84   Patient Site: Left Arm   Patient Position: Sitting   Cuff Size: Adult Large   Pulse: 64   Temp: 97.7  F (36.5  C)   TempSrc: Tympanic   SpO2: 99%   Weight: 210 lb (95.3 kg)   Height: 6' 0.44\" (1.84 m)     Wt Readings from Last 3 Encounters:   02/15/19 210 lb (95.3 kg)   02/07/19 215 lb 1.6 oz (97.6 kg)   03/26/18 211 lb (95.7 kg)     Body mass index is 28.14 kg/m .    PHYSICAL " EXAM:  Constitutional:   Reveals an alert oriented pleasant man, affect appropriate, does not appear acutely ill.  19.3 % body fat by handheld bioelectric impedance analysis. Vitals: per nursing notes.  HEENT:  Ears:  External canals, TMs clear.    Eyes:  EOMs full, PERRL.  Oropharynx:   Mouth and throat clear, no thrush or exudate.  Neck:  Supple, no carotid bruits or adenopathy.  Back:  No spine or CVA pain.  Thorax:  No bony deformities.  Lungs: Clear to A&P without rales or wheezes.  Respiratory effort normal.  Cardiac:   Regular rate and rhythm, normal S1, S2, no murmur or gallop.  Abdomen:  Soft, active bowel sounds without bruits, mass, or tenderness.  :  circumcised,  No testicular masses, no penile lesions.    Rectal: not done  Extremities: well developed,  No peripheral edema or joint deformities,  Skin:  No jaundice, peripheral cyanosis or lesions to suggest malignancy.  Neuro:  Alert and oriented. Cranial nerves, motor, sensory exams are intact.  No gross focal deficits.  Psychiatric:  Memory intact, mood appropriate.    QUALITY MEASURES:  The following high BMI interventions were performed this visit: encouragement to exercise and dietary needs education    ADDITIONAL HISTORY SUMMARIZED (2): Patient provided results on his health insurance screening visit provided by patient and reviewed in office during visit today.   DECISION TO OBTAIN EXTRA INFORMATION (1): None.   RADIOLOGY TESTS (1): None.  LABS (1): 3/26/18 labs reviewed and ordered labs today.   MEDICINE TESTS (1): None.  INDEPENDENT REVIEW (2 each): None.     The visit lasted a total of 20 minutes face to face with the patient. Over 50% of the time was spent counseling and educating the patient about chest wall pain, hypercholesterolemia, constipation, diet, and health maintenance.    IMarvin, am scribing for and in the presence of, Dr. Scott.    I, he has noted this on, personally performed the services described in this  documentation, as scribed by Marvin Antoine in my presence, and it is both accurate and complete.    Dragon dictation was used for this note.  Speech recognition errors are a possibility.    MEDICATIONS:  No current outpatient medications on file.     No current facility-administered medications for this visit.        Total data points: 3